# Patient Record
Sex: FEMALE | Employment: FULL TIME | URBAN - METROPOLITAN AREA
[De-identification: names, ages, dates, MRNs, and addresses within clinical notes are randomized per-mention and may not be internally consistent; named-entity substitution may affect disease eponyms.]

---

## 2017-12-05 ENCOUNTER — OFFICE VISIT (OUTPATIENT)
Dept: FAMILY MEDICINE | Facility: CLINIC | Age: 37
End: 2017-12-05
Payer: COMMERCIAL

## 2017-12-05 VITALS
WEIGHT: 208 LBS | OXYGEN SATURATION: 98 % | TEMPERATURE: 97.7 F | RESPIRATION RATE: 18 BRPM | HEART RATE: 81 BPM | BODY MASS INDEX: 37.44 KG/M2 | SYSTOLIC BLOOD PRESSURE: 128 MMHG | DIASTOLIC BLOOD PRESSURE: 82 MMHG

## 2017-12-05 DIAGNOSIS — J06.9 VIRAL URI WITH COUGH: Primary | ICD-10-CM

## 2017-12-05 DIAGNOSIS — Z82.49 FAMILY HISTORY OF ISCHEMIC HEART DISEASE: ICD-10-CM

## 2017-12-05 DIAGNOSIS — R11.2 NAUSEA AND VOMITING, INTRACTABILITY OF VOMITING NOT SPECIFIED, UNSPECIFIED VOMITING TYPE: ICD-10-CM

## 2017-12-05 PROBLEM — E66.01 MORBID OBESITY (H): Status: ACTIVE | Noted: 2017-12-05

## 2017-12-05 PROCEDURE — 99214 OFFICE O/P EST MOD 30 MIN: CPT | Performed by: PHYSICIAN ASSISTANT

## 2017-12-05 RX ORDER — FLUTICASONE PROPIONATE 50 MCG
2 SPRAY, SUSPENSION (ML) NASAL DAILY
Qty: 1 BOTTLE | Refills: 3 | Status: SHIPPED | OUTPATIENT
Start: 2017-12-05

## 2017-12-05 RX ORDER — ONDANSETRON 4 MG/1
4 TABLET, FILM COATED ORAL EVERY 8 HOURS PRN
Qty: 20 TABLET | Refills: 2 | Status: SHIPPED | OUTPATIENT
Start: 2017-12-05

## 2017-12-05 NOTE — NURSING NOTE
"Chief Complaint   Patient presents with     URI       Initial /82  Pulse 81  Temp 97.7  F (36.5  C) (Tympanic)  Resp 18  Wt 208 lb (94.3 kg)  LMP 11/23/2017 (Approximate)  SpO2 98%  BMI 37.44 kg/m2 Estimated body mass index is 37.44 kg/(m^2) as calculated from the following:    Height as of 5/16/16: 5' 2.5\" (1.588 m).    Weight as of this encounter: 208 lb (94.3 kg).  Medication Reconciliation: complete    "

## 2017-12-05 NOTE — MR AVS SNAPSHOT
After Visit Summary   12/5/2017    Imelda Winters    MRN: [id number]           Patient Information     Date Of Birth          1980        Visit Information        Provider Department      12/5/2017 10:50 AM Tori Olivia PA-C [facility]        Today's Diagnoses     Viral URI with cough    -  1    Nausea and vomiting, intractability of vomiting not specified, unspecified vomiting type        Family history of ischemic heart disease           Follow-ups after your visit        Your next 10 appointments already scheduled     Dec 05, 2017 10:50 AM CST   Office Visit with Tori Olivia PA-C   [facility] ([facility])    [address] [place] [id number] [phone]           Bring a current list of meds and any records pertaining to this visit. For Physicals, please bring immunization records and any forms needing to be filled out. Please arrive 10 minutes early to complete paperwork.              Who to contact     If you have questions or need follow up information about today's clinic visit or your schedule please contact [facility] directly at [phone].  Normal or non-critical lab and imaging results will be communicated to you by MyChart, letter or phone within 4 business days after the clinic has received the results. If you do not hear from us within 7 days, please contact the clinic through [employer] or phone. If you have a critical or abnormal lab result, we will notify you by phone as soon as possible.  Submit refill requests through [employer] or call your pharmacy and they will forward the refill request to us. Please allow 3 business days for your refill to be completed.          Additional Information About Your Visit        [employer] Information     [employer] lets you send messages to your doctor, view your test  "results, renew your prescriptions, schedule appointments and more. To sign up, go to [email or web address] . Click on \"Log in\" on the left side of the screen, which will take you to the Welcome page. Then click on \"Sign up Now\" on the right side of the page.     You will be asked to enter the access code listed below, as well as some personal information. Please follow the directions to create your username and password.     Your access code is: [email or web address]  Expires: 3/5/2018 10:46 AM     Your access code will  in 90 days. If you need help or a new code, please call your [facility] clinic or [phone].        Care EveryWhere ID     This is your Care EveryWhere ID. This could be used by other organizations to access your [facility] medical records  [id number]        Your Vitals Were     Pulse Temperature Respirations Last Period Pulse Oximetry BMI (Body Mass Index)    81 97.7  F (36.5  C) (Tympanic) 18 2017 (Approximate) 98% 37.44 kg/m2       Blood Pressure from Last 3 Encounters:   17 128/82   16 126/76    Weight from Last 3 Encounters:   17 208 lb (94.3 kg)   16 204 lb (92.5 kg)              Today, you had the following     No orders found for display         Today's Medication Changes          These changes are accurate as of: 17 10:46 AM.  If you have any questions, ask your nurse or doctor.               Start taking these medicines.        Dose/Directions    fluticasone 50 MCG/ACT spray   Commonly known as:  FLONASE   Used for:  Viral URI with cough   Started by:  Tori Olivia PA-C        Dose:  2 spray   Spray 2 sprays into both nostrils daily   Quantity:  1 Bottle   Refills:  3       ondansetron 4 MG tablet   Commonly known as:  ZOFRAN   Used for:  Nausea and vomiting, intractability of vomiting not specified, unspecified vomiting type   Started by:  Tori Olivia PA-C        Dose:  4 mg   Take 1 tablet (4 mg) by mouth every 8 hours " as needed for nausea   Quantity:  20 tablet   Refills:  2         Stop taking these medicines if you haven't already. Please contact your care team if you have questions.     typhoid CR capsule   Commonly known as:  VIVOTIF   Stopped by:  Tori Olivia PA-C                Where to get your medicines      These medications were sent to [place] [id number] [place] [facility] [address]  [address], [facility] [address]     Phone:  [phone]     fluticasone 50 MCG/ACT spray    ondansetron 4 MG tablet                Primary Care Provider Office Phone # Fax #    Tori Olivia PA-C [phone]       [address] [facility] [address]  [facility] [address]        Equal Access to Services     NAINA OLIVER : Hadii aad ku hadasho Soomaali, waaxda luqadaha, qaybta kaalmada adeegyada, waxay davinin haygilliann mercedes villarreal . So [facility] [phone].    ATENCIÓN: Si habla español, tiene a osorio disposición servicios gratuitos de asistencia lingüística. Llame al [phone].    We comply with applicable federal civil rights laws and [place] laws. We do not discriminate on the basis of race, color, national origin, age, disability, sex, sexual orientation, or gender identity.            Thank you!     Thank you for choosing [facility]  for your care. Our goal is always to provide you with excellent care. Hearing back from our patients is one way we can continue to improve our services. Please take a few minutes to complete the written survey that you may receive in the mail after your visit with us. Thank you!             Your Updated Medication List - Protect others around you: Learn how to safely use, store and throw away your medicines at www.disposemymeds.org.          This list is accurate as of: 12/5/17 10:46 AM.  Always use your most recent med list.                   Brand Name Dispense Instructions for use Diagnosis    cetirizine 10 MG tablet     zyrTEC     Take 10 mg by mouth daily        fluticasone 50 MCG/ACT spray    FLONASE    1 Bottle    Spray 2 sprays into both nostrils daily    Viral URI with cough       ondansetron 4 MG tablet    ZOFRAN    20 tablet    Take 1 tablet (4 mg) by mouth every 8 hours as needed for nausea    Nausea and vomiting, intractability of vomiting not specified, unspecified vomiting type

## 2017-12-05 NOTE — LETTER
December 5, 2017      Imelda Winters  [address] [address]        To Whom It May Concern:    Imelda Winters was seen in our clinic for illness.         Sincerely,        Tori Olivia PA-C

## 2017-12-05 NOTE — PROGRESS NOTES
SUBJECTIVE:   Imelda Winters is a 37 year old female who presents to clinic today for the following health issues:    ENT Symptoms             Symptoms: cc Present Absent Comment   Fever/Chills  x  Monday and Tues, night sweats   Fatigue  x     Muscle Aches  x     Eye Irritation   x    Sneezing   x    Nasal Dayne/Drg  x     Sinus Pressure/Pain  x     Loss of smell   x    Dental pain   x    Sore Throat  x     Swollen Glands  x     Ear Pain/Fullness  x  Right ear fullness   Cough  x     Wheeze   x    Chest Pain   x    Shortness of breath   x    Rash   x    Other  x  Nausea, no vomiting, weakness     Symptom duration:  Monday last week, 9 days   Symptom severity:  moderate   Treatments tried:  ibuprofen, tylenol, chloraseptic spray   Contacts:  son     Reviewed and updated as needed this visit by clinical staff  Tobacco  Allergies  Meds  Med Hx  Surg Hx  Fam Hx  Soc Hx      Reviewed and updated as needed this visit by Provider  Tobacco  Allergies  Meds  Med Hx  Surg Hx  Fam Hx  Soc Hx      Additional complaints: None    HPI additional notes: Imelda presents today with   Chief Complaint   Patient presents with     URI     BP Readings from Last 6 Encounters:   12/05/17 128/82   05/16/16 126/76     Having significant nausea over the last few days and has had to miss work.  Also has had some vertigo.      Father had heart attack at age 45.  Pt has been having slightly elevated blood pressures when at clinic visits or checking at home.  Her cholesterol is slightly elevated. She does not smoke.  She is over weight.    Poor sleeping and eating.  Took some ibuprofen on an empty stomach which possibly made her stomach pain worse.  Has had nausea but no emesis.  Notes ear pressure, congestion, mild cough.  Denies sore throat or sinus pain.     ROS:  C: POSITIVE for fever and chills.  Skin: Negative for worrisome rashes or lesions  ENT/MOUTH:POSITIVE for congestion, ear pain, post-nasal drainage and vertigo.  Negative for  sore throat and sinus pressure.  Resp: POSITIVE for cough occasionally productive without  SOB and wheezing  CV: Negative for chest pain or peripheral edema  GI:POSITIVE for diarrhea, nausea and poor appetite and NEGATIVE for abdominal pain, constipation, heartburn or reflux, hematemesis, hematochezia, jaundice, melena and vomiting  MS: POSITIVE for generalized fatigue and malaise.  PSYCHIATRIC: POSITIVE for insomnia or fatigue. Negative for depressed mood  ROS all other systems negative.    Chart Review:  History   Smoking Status     Never Smoker   Smokeless Tobacco     Never Used     Patient Active Problem List   Diagnosis     Seasonal allergic rhinitis     BMI > 35     Family history of ischemic heart disease     Past Surgical History:   Procedure Laterality Date     APPENDECTOMY  1999     Problem list, Medication list, Allergies, Medical/Social/Surg hx reviewed in [facility], updated as appropriate.   OBJECTIVE:                                                    /82  Pulse 81  Temp 97.7  F (36.5  C) (Tympanic)  Resp 18  Wt 208 lb (94.3 kg)  LMP 11/23/2017 (Approximate)  SpO2 98%  BMI 37.44 kg/m2  Body mass index is 37.44 kg/(m^2).  GENERAL: healthy, alert, in no acute distress  EYES: Grossly normal to inspection, EOMI, PERRL  HENT: Ear canals normal bilaterally. TM dull gray  bulging with serous effusion right. Nasal mucosa mildly edematous with clear rhinorrhea.  Mouth- mucous membranes moist, no lesions or ulcerations. Pharynx pink. and No tonsillary hypertrophy. Uvula midline, clear post-nasal drainage.  Maxillary and frontal sinuses nontender to palpation bilaterally  NECK: Non-tender, no adenopathy.  RESP: lungs clear to auscultation - no rales, no rhonchi, no wheezes  CV: regular rate and rhythm, normal S1 S2. No peripheral edema.  ABDOMEN: soft, nontender, no hepatosplenomegaly or masses. Normal bowel sounds in all four quadrants. Vogel's negative, Rovsing's negative. No rebound.  MS: no gross  deformities noted.  No CVA tenderness. No paralumbar tenderness.  SKIN: no suspicious lesions, no rashes  PSYCH: Alert and oriented times 3;  Able to articulate logical thoughts. Affect is normal.    Diagnostic test results: None     ASSESSMENT/PLAN:                                                          ICD-10-CM    1. Viral URI with cough J06.9 fluticasone (FLONASE) 50 MCG/ACT spray    B97.89    2. Nausea and vomiting, intractability of vomiting not specified, unspecified vomiting type R11.2 ondansetron (ZOFRAN) 4 MG tablet   3. Family history of ischemic heart disease Z82.49        Discussed viral URI, will start on flonase to help with congestion and middle ear fluid.  Discussed this is likely causing her vertigo and possibly her nausea.  Discussed eating small food throughout the day as fasting will make nausea worse.  Will start on zofran prn to help with nausea.    Discussed heart disease risk secondary to father history of MI at age 45 and pt weight.  Will check her blood pressure 2 times per week for the next two weeks and call if her numbers are over 140.  Her cholesterol was checked last year and was within normal limits.  I don't think any of her symptoms today are cardiac in nature.  She has no diaphoresis, jaw, arm or chest pain.    Please see patient instructions for treatment details.    Follow up in 7-10 days if not improving as anticipated.    Tori Olivia PA-C  [facility]

## 2020-03-10 ENCOUNTER — TRANSFERRED RECORDS (OUTPATIENT)
Dept: HEALTH INFORMATION MANAGEMENT | Facility: CLINIC | Age: 40
End: 2020-03-10

## 2020-03-16 ENCOUNTER — OFFICE VISIT (OUTPATIENT)
Dept: FAMILY MEDICINE | Facility: CLINIC | Age: 40
End: 2020-03-16
Payer: COMMERCIAL

## 2020-03-16 VITALS
WEIGHT: 239 LBS | SYSTOLIC BLOOD PRESSURE: 154 MMHG | BODY MASS INDEX: 43.02 KG/M2 | DIASTOLIC BLOOD PRESSURE: 102 MMHG | HEART RATE: 103 BPM | TEMPERATURE: 98.5 F | OXYGEN SATURATION: 94 %

## 2020-03-16 DIAGNOSIS — J20.9 ACUTE BRONCHITIS, UNSPECIFIED ORGANISM: Primary | ICD-10-CM

## 2020-03-16 DIAGNOSIS — I10 BENIGN ESSENTIAL HYPERTENSION: ICD-10-CM

## 2020-03-16 PROCEDURE — 99214 OFFICE O/P EST MOD 30 MIN: CPT | Performed by: FAMILY MEDICINE

## 2020-03-16 RX ORDER — LISINOPRIL 10 MG/1
10 TABLET ORAL DAILY
Qty: 30 TABLET | Refills: 1 | Status: SHIPPED | OUTPATIENT
Start: 2020-03-16 | End: 2020-05-18

## 2020-03-16 RX ORDER — AZITHROMYCIN 250 MG/1
TABLET, FILM COATED ORAL
Qty: 6 TABLET | Refills: 0 | Status: SHIPPED | OUTPATIENT
Start: 2020-03-16

## 2020-03-16 RX ORDER — ALBUTEROL SULFATE 90 UG/1
AEROSOL, METERED RESPIRATORY (INHALATION)
COMMUNITY
Start: 2020-03-10

## 2020-03-16 RX ORDER — BENZONATATE 100 MG/1
CAPSULE ORAL
COMMUNITY
Start: 2020-03-10

## 2020-03-16 RX ORDER — PREDNISONE 20 MG/1
20 TABLET ORAL DAILY
Qty: 5 TABLET | Refills: 0 | Status: SHIPPED | OUTPATIENT
Start: 2020-03-16

## 2020-03-16 NOTE — PROGRESS NOTES
Subjective     Imelda Winters is a 39 year old female who presents to clinic today for the following health issues:    HPI   RESPIRATORY SYMPTOMS      Duration: 3 weeks    Description  cough, wheezing and short of breath, uncontrol bladder with coughing, poor sleep,nausea, body ache, cold  sores    Severity: moderate    Accompanying signs and symptoms: None    History (predisposing factors):  none    Precipitating or alleviating factors: tessalon, inhaler    Therapies tried and outcome:  Day quil, night quil, delsym, cough drops, steam    Pt was [facility].  No antibiotic but was given Rx for Tessalon          Allergies   Allergen Reactions     Cats      Pollen Extract        Reviewed and updated as needed this visit by Provider         Review of Systems   ROS COMP: CONSTITUTIONAL: NEGATIVE for fever, chills, change in weight  ENT/MOUTH: POSITIVE for nasal congestion, postnasal drainage and sore throat  RESP:POSITIVE for cough-productive, dyspnea on exertion and wheezing  CV: NEGATIVE for chest pain, palpitations or peripheral edema  : incontinence      Objective    BP (!) 154/102 (BP Location: Left arm, Patient Position: Sitting, Cuff Size: Adult Large)   Pulse 103   Temp 98.5  F (36.9  C) (Tympanic)   Wt 108.4 kg (239 lb)   SpO2 94%   BMI 43.02 kg/m    Body mass index is 43.02 kg/m .  Physical Exam   GENERAL: healthy, alert and no distress  HENT: normal cephalic/atraumatic, ear canals and TM's normal, nose and mouth without ulcers or lesions, oropharynx clear, oral mucous membranes moist and sinuses: not tender  NECK: no adenopathy, no asymmetry, masses, or scars and thyroid normal to palpation  RESP: rhonchi R mid posterior and L mid posterior and expiratory wheezes R mid posterior and L mid posterior  CV: regular rate and rhythm, normal S1 S2, no S3 or S4, no murmur, click or rub, no peripheral edema and peripheral pulses strong  ABDOMEN: soft, nontender, no hepatosplenomegaly, no masses  and bowel sounds normal    Diagnostic Test Results:  Labs reviewed in Epic  none         Assessment & Plan     Imelda was seen today for uri.    Diagnoses and all orders for this visit:    Acute bronchitis, unspecified organism  -     azithromycin (ZITHROMAX) 250 MG tablet; Two tablets first day, then one tablet daily for four days.  -     predniSONE (DELTASONE) 20 MG tablet; Take 1 tablet (20 mg) by mouth daily    Benign essential hypertension  -     lisinopril (ZESTRIL) 10 MG tablet; Take 1 tablet (10 mg) by mouth daily           FUTURE APPOINTMENTS:       - Follow-up visit in 1 mo for blood pressure, earlier if cough not improving  Patient Instructions   Symptomatic treatment.  Will use saline gargles, tylenol and/or advil. Suck on  lozenges as needed. Push fluids. Salt water nasal spray as needed.  Use expectorant such as Mucinex or Robitussin for cough      Return in about 1 month (around 4/16/2020) for Hypertension, bronchitis.    Angel De Santiago MD  [facility]

## 2020-03-22 ENCOUNTER — HEALTH MAINTENANCE LETTER (OUTPATIENT)
Age: 40
End: 2020-03-22

## 2020-05-18 DIAGNOSIS — I10 BENIGN ESSENTIAL HYPERTENSION: ICD-10-CM

## 2020-05-18 RX ORDER — LISINOPRIL 10 MG/1
TABLET ORAL
Qty: 30 TABLET | Refills: 0 | Status: SHIPPED | OUTPATIENT
Start: 2020-05-18

## 2021-01-15 ENCOUNTER — HEALTH MAINTENANCE LETTER (OUTPATIENT)
Age: 41
End: 2021-01-15

## 2021-05-15 ENCOUNTER — HEALTH MAINTENANCE LETTER (OUTPATIENT)
Age: 41
End: 2021-05-15

## 2021-09-04 ENCOUNTER — HEALTH MAINTENANCE LETTER (OUTPATIENT)
Age: 41
End: 2021-09-04

## 2022-06-11 ENCOUNTER — HEALTH MAINTENANCE LETTER (OUTPATIENT)
Age: 42
End: 2022-06-11

## 2022-10-22 ENCOUNTER — HEALTH MAINTENANCE LETTER (OUTPATIENT)
Age: 42
End: 2022-10-22

## 2023-06-18 ENCOUNTER — HEALTH MAINTENANCE LETTER (OUTPATIENT)
Age: 43
End: 2023-06-18

## 2023-11-05 ENCOUNTER — HEALTH MAINTENANCE LETTER (OUTPATIENT)
Age: 43
End: 2023-11-05